# Patient Record
Sex: FEMALE | Race: WHITE | URBAN - METROPOLITAN AREA
[De-identification: names, ages, dates, MRNs, and addresses within clinical notes are randomized per-mention and may not be internally consistent; named-entity substitution may affect disease eponyms.]

---

## 2017-04-11 ENCOUNTER — HOSPITAL ENCOUNTER (OUTPATIENT)
Dept: CT IMAGING | Age: 37
Discharge: HOME | End: 2017-04-11
Attending: INTERNAL MEDICINE | Admitting: INTERNAL MEDICINE

## 2017-04-11 DIAGNOSIS — R93.89 ABNORMAL RADIOLOGICAL FINDINGS IN SKIN AND SUBCUTANEOUS TISSUE: ICD-10-CM

## 2017-04-11 DIAGNOSIS — R06.02 SHORTNESS OF BREATH: ICD-10-CM

## 2017-10-04 ENCOUNTER — HOSPITAL ENCOUNTER (OUTPATIENT)
Dept: OTHER | Age: 37
Discharge: HOME | End: 2017-10-04
Attending: INTERNAL MEDICINE | Admitting: INTERNAL MEDICINE

## 2017-10-04 DIAGNOSIS — M79.672 LEFT FOOT PAIN: ICD-10-CM

## 2018-11-15 ENCOUNTER — HOSPITAL ENCOUNTER (EMERGENCY)
Age: 38
Discharge: HOME | End: 2018-11-15
Attending: EMERGENCY MEDICINE
Payer: MEDICARE

## 2018-11-15 ENCOUNTER — APPOINTMENT (OUTPATIENT)
Dept: GENERAL RADIOLOGY | Age: 38
End: 2018-11-15
Payer: MEDICARE

## 2018-11-15 VITALS
HEART RATE: 59 BPM | RESPIRATION RATE: 17 BRPM | TEMPERATURE: 98 F | OXYGEN SATURATION: 96 % | BODY MASS INDEX: 43.77 KG/M2 | DIASTOLIC BLOOD PRESSURE: 92 MMHG | WEIGHT: 247 LBS | SYSTOLIC BLOOD PRESSURE: 122 MMHG | HEIGHT: 63 IN

## 2018-11-15 DIAGNOSIS — R07.89 ATYPICAL CHEST PAIN: Primary | ICD-10-CM

## 2018-11-15 LAB
ANION GAP SERPL CALCULATED.3IONS-SCNC: 12 MMOL/L (ref 3–16)
BASOPHILS ABSOLUTE: 0.1 K/UL (ref 0–0.2)
BASOPHILS RELATIVE PERCENT: 0.9 %
BUN BLDV-MCNC: 15 MG/DL (ref 7–20)
CALCIUM SERPL-MCNC: 8.8 MG/DL (ref 8.3–10.6)
CHLORIDE BLD-SCNC: 106 MMOL/L (ref 99–110)
CO2: 21 MMOL/L (ref 21–32)
CREAT SERPL-MCNC: 0.6 MG/DL (ref 0.6–1.1)
EKG ATRIAL RATE: 90 BPM
EKG DIAGNOSIS: NORMAL
EKG P AXIS: 33 DEGREES
EKG P-R INTERVAL: 164 MS
EKG Q-T INTERVAL: 360 MS
EKG QRS DURATION: 78 MS
EKG QTC CALCULATION (BAZETT): 440 MS
EKG R AXIS: 13 DEGREES
EKG T AXIS: 58 DEGREES
EKG VENTRICULAR RATE: 90 BPM
EOSINOPHILS ABSOLUTE: 0.2 K/UL (ref 0–0.6)
EOSINOPHILS RELATIVE PERCENT: 1.6 %
GFR AFRICAN AMERICAN: >60
GFR NON-AFRICAN AMERICAN: >60
GLUCOSE BLD-MCNC: 102 MG/DL (ref 70–99)
HCT VFR BLD CALC: 40.5 % (ref 36–48)
HEMOGLOBIN: 13.6 G/DL (ref 12–16)
LYMPHOCYTES ABSOLUTE: 2.8 K/UL (ref 1–5.1)
LYMPHOCYTES RELATIVE PERCENT: 29.4 %
MCH RBC QN AUTO: 27 PG (ref 26–34)
MCHC RBC AUTO-ENTMCNC: 33.7 G/DL (ref 31–36)
MCV RBC AUTO: 80.3 FL (ref 80–100)
MONOCYTES ABSOLUTE: 0.8 K/UL (ref 0–1.3)
MONOCYTES RELATIVE PERCENT: 8.6 %
NEUTROPHILS ABSOLUTE: 5.7 K/UL (ref 1.7–7.7)
NEUTROPHILS RELATIVE PERCENT: 59.5 %
PDW BLD-RTO: 16.4 % (ref 12.4–15.4)
PLATELET # BLD: 222 K/UL (ref 135–450)
PMV BLD AUTO: 9.2 FL (ref 5–10.5)
POTASSIUM SERPL-SCNC: 4.2 MMOL/L (ref 3.5–5.1)
RBC # BLD: 5.04 M/UL (ref 4–5.2)
SODIUM BLD-SCNC: 139 MMOL/L (ref 136–145)
TROPONIN: <0.01 NG/ML
TROPONIN: <0.01 NG/ML
WBC # BLD: 9.5 K/UL (ref 4–11)

## 2018-11-15 PROCEDURE — 83880 ASSAY OF NATRIURETIC PEPTIDE: CPT

## 2018-11-15 PROCEDURE — 71046 X-RAY EXAM CHEST 2 VIEWS: CPT

## 2018-11-15 PROCEDURE — 99285 EMERGENCY DEPT VISIT HI MDM: CPT

## 2018-11-15 PROCEDURE — 84484 ASSAY OF TROPONIN QUANT: CPT

## 2018-11-15 PROCEDURE — 85025 COMPLETE CBC W/AUTO DIFF WBC: CPT

## 2018-11-15 PROCEDURE — 80048 BASIC METABOLIC PNL TOTAL CA: CPT

## 2018-11-15 PROCEDURE — 93005 ELECTROCARDIOGRAM TRACING: CPT | Performed by: EMERGENCY MEDICINE

## 2018-11-15 RX ORDER — AMLODIPINE BESYLATE 5 MG/1
5 TABLET ORAL
COMMUNITY

## 2018-11-15 RX ORDER — ROSUVASTATIN CALCIUM 5 MG/1
5 TABLET, COATED ORAL
COMMUNITY
Start: 2016-10-02

## 2018-11-15 RX ORDER — LIRAGLUTIDE 6 MG/ML
INJECTION SUBCUTANEOUS
COMMUNITY
Start: 2018-09-14

## 2018-11-15 RX ORDER — METHOCARBAMOL 500 MG/1
250 TABLET, FILM COATED ORAL
COMMUNITY
Start: 2018-09-05

## 2018-11-15 ASSESSMENT — ENCOUNTER SYMPTOMS
SORE THROAT: 0
VOMITING: 0
ABDOMINAL PAIN: 0
FACIAL SWELLING: 0
DIARRHEA: 0
CHEST TIGHTNESS: 0
NAUSEA: 0
SINUS PAIN: 0
SINUS PRESSURE: 0
EYE REDNESS: 0
EYE PAIN: 0
SHORTNESS OF BREATH: 0
EYE DISCHARGE: 0
CHOKING: 0
EYE ITCHING: 0
RHINORRHEA: 0
CONSTIPATION: 0
BACK PAIN: 1
ABDOMINAL DISTENTION: 0
WHEEZING: 0
COUGH: 1
STRIDOR: 0
COLOR CHANGE: 0
APNEA: 0

## 2018-11-15 ASSESSMENT — PAIN DESCRIPTION - LOCATION: LOCATION: CHEST

## 2018-11-15 ASSESSMENT — PAIN DESCRIPTION - DESCRIPTORS: DESCRIPTORS: SHARP

## 2018-11-15 ASSESSMENT — PAIN DESCRIPTION - PAIN TYPE: TYPE: ACUTE PAIN

## 2018-11-15 ASSESSMENT — PAIN DESCRIPTION - ORIENTATION: ORIENTATION: LEFT

## 2018-11-15 ASSESSMENT — PAIN SCALES - GENERAL: PAINLEVEL_OUTOF10: 8

## 2018-11-15 NOTE — ED PROVIDER NOTES
emergencydepartment physician Marck Hassan MD  Rhythm: normal sinus   Rate: normal  Axis: normal  Ectopy: none  Conduction: normal  ST Segments: normal  T Waves:normal  Q Waves: none  Clinical Impression: no acute changes      RADIOLOGY:  XR CHEST STANDARD (2 VW)   Final Result      No evidence for acute cardiopulmonary disease. LABS:   Results for orders placed or performed during the hospital encounter of 11/15/18   Troponin   Result Value Ref Range    Troponin <0.01 <0.01 ng/mL   Basic Metabolic Panel   Result Value Ref Range    Sodium 139 136 - 145 mmol/L    Potassium 4.2 3.5 - 5.1 mmol/L    Chloride 106 99 - 110 mmol/L    CO2 21 21 - 32 mmol/L    Anion Gap 12 3 - 16    Glucose 102 (H) 70 - 99 mg/dL    BUN 15 7 - 20 mg/dL    CREATININE 0.6 0.6 - 1.1 mg/dL    GFR Non-African American >60 >60    GFR African American >60 >60    Calcium 8.8 8.3 - 10.6 mg/dL   CBC auto differential   Result Value Ref Range    WBC 9.5 4.0 - 11.0 K/uL    RBC 5.04 4.00 - 5.20 M/uL    Hemoglobin 13.6 12.0 - 16.0 g/dL    Hematocrit 40.5 36.0 - 48.0 %    MCV 80.3 80.0 - 100.0 fL    MCH 27.0 26.0 - 34.0 pg    MCHC 33.7 31.0 - 36.0 g/dL    RDW 16.4 (H) 12.4 - 15.4 %    Platelets 596 551 - 407 K/uL    MPV 9.2 5.0 - 10.5 fL    Neutrophils % 59.5 %    Lymphocytes % 29.4 %    Monocytes % 8.6 %    Eosinophils % 1.6 %    Basophils % 0.9 %    Neutrophils # 5.7 1.7 - 7.7 K/uL    Lymphocytes # 2.8 1.0 - 5.1 K/uL    Monocytes # 0.8 0.0 - 1.3 K/uL    Eosinophils # 0.2 0.0 - 0.6 K/uL    Basophils # 0.1 0.0 - 0.2 K/uL   Troponin   Result Value Ref Range    Troponin <0.01 <0.01 ng/mL   EKG 12 Lead   Result Value Ref Range    Ventricular Rate 90 BPM    Atrial Rate 90 BPM    P-R Interval 164 ms    QRS Duration 78 ms    Q-T Interval 360 ms    QTc Calculation (Bazett) 440 ms    P Axis 33 degrees    R Axis 13 degrees    T Axis 58 degrees    Diagnosis       EKG performed in ER and to be interpreted by ER physician. Confirmed by MD, ER ([id number]),

## 2018-11-15 NOTE — ED TRIAGE NOTES
Patient to ER c/o chest pain that radiates along her left arm and pt states she is chronically short of breath.

## 2018-11-16 LAB
B-TYPE NATRIURETIC PEPTIDE: 22 PG/ML (ref 0–99.9)
PERFORMED ON: NORMAL
POC SAMPLE TYPE: NORMAL

## 2019-08-01 ENCOUNTER — HOSPITAL ENCOUNTER (EMERGENCY)
Age: 39
Discharge: HOME | End: 2019-08-01
Payer: MEDICARE

## 2019-08-01 ENCOUNTER — APPOINTMENT (OUTPATIENT)
Dept: GENERAL RADIOLOGY | Age: 39
End: 2019-08-01
Payer: MEDICARE

## 2019-08-01 VITALS
SYSTOLIC BLOOD PRESSURE: 113 MMHG | HEART RATE: 92 BPM | DIASTOLIC BLOOD PRESSURE: 83 MMHG | RESPIRATION RATE: 12 BRPM | OXYGEN SATURATION: 97 % | TEMPERATURE: 99.2 F

## 2019-08-01 DIAGNOSIS — S60.211A CONTUSION OF RIGHT WRIST, INITIAL ENCOUNTER: Primary | ICD-10-CM

## 2019-08-01 PROCEDURE — 99283 EMERGENCY DEPT VISIT LOW MDM: CPT

## 2019-08-01 PROCEDURE — 73110 X-RAY EXAM OF WRIST: CPT

## 2019-08-01 RX ORDER — KETOROLAC TROMETHAMINE 30 MG/ML
15 INJECTION, SOLUTION INTRAMUSCULAR; INTRAVENOUS ONCE
Status: DISCONTINUED | OUTPATIENT
Start: 2019-08-01 | End: 2019-08-02 | Stop reason: HOSPADM

## 2019-08-01 ASSESSMENT — PAIN DESCRIPTION - LOCATION: LOCATION: WRIST

## 2019-08-01 ASSESSMENT — PAIN SCALES - GENERAL: PAINLEVEL_OUTOF10: 4

## 2019-08-01 ASSESSMENT — PAIN DESCRIPTION - PAIN TYPE: TYPE: ACUTE PAIN

## 2019-08-01 ASSESSMENT — PAIN DESCRIPTION - ORIENTATION: ORIENTATION: RIGHT

## 2019-08-05 ASSESSMENT — ENCOUNTER SYMPTOMS
VOMITING: 0
DIARRHEA: 0
SORE THROAT: 0
ABDOMINAL PAIN: 0
NAUSEA: 0
SHORTNESS OF BREATH: 0
COUGH: 0
WHEEZING: 0

## 2019-08-05 NOTE — ED PROVIDER NOTES
Diabetes mellitus ([facility] [address] Fibromyalgia, GERD (gastroesophageal reflux disease), Hypertension, Morbid obesity ([facility] [address] and Self mutilating behavior. She has a past surgical history that includes Breast surgery; Tubal ligation; Cardiac catheterization (10/01/2015); and Hand surgery. Her family history includes Cancer in her maternal grandfather, maternal grandmother, paternal grandfather, and paternal grandmother; Heart Disease in her brother, brother, father, maternal grandfather, and paternal grandfather. She reports that she has been smoking cigarettes. She has a 27.00 pack-year smoking history. She has quit using smokeless tobacco. She reports that she does not drink alcohol or use drugs. Medications     Discharge Medication List as of 8/1/2019 11:36 PM      CONTINUE these medications which have NOT CHANGED    Details   VICTOZA 18 MG/3ML SOPN SC injection DAWHistorical Med      amLODIPine (NORVASC) 5 MG tablet Take 5 mg by mouthHistorical Med      rosuvastatin (CRESTOR) 5 MG tablet Take 5 mg by mouthHistorical Med      methocarbamol (ROBAXIN) 500 MG tablet Take 250 mg by mouthHistorical Med      ondansetron (ZOFRAN ODT) 4 MG disintegrating tablet Take 1 tablet by mouth every 8 hours as needed for Nausea, Disp-20 tablet, R-0Print      pantoprazole (PROTONIX) 40 MG tablet Take 1 tablet by mouth daily, Disp-30 tablet, R-0Print      celecoxib (CELEBREX) 100 MG capsule Take 100 mg by mouth 2 times daily      aspirin 325 MG tablet Take 325 mg by mouth daily. metFORMIN ER (GLUCOPHAGE-XR) 500 MG XR tablet Take 1 tablet by mouth daily (with breakfast). Start from Thursday, Disp-30 tablet, R-3      metoprolol (LOPRESSOR) 25 MG tablet Take 1 tablet by mouth 2 times daily. , Disp-60 tablet, R-3      HYDROcodone-acetaminophen (NORCO) 5-325 MG per tablet Take 1 tablet by mouth every 6 hours as needed for Pain.       Cholecalciferol (VITAMIN D) 2000 UNITS CAPS capsule Take 1 capsule by mouth daily       pregabalin (LYRICA)

## 2019-09-27 ENCOUNTER — HOSPITAL ENCOUNTER (OUTPATIENT)
Age: 39
Setting detail: SPECIMEN
Discharge: HOME | End: 2019-09-27
Payer: MEDICARE

## 2019-09-27 LAB
ANION GAP SERPL CALCULATED.3IONS-SCNC: 11 MMOL/L (ref 3–16)
BUN BLDV-MCNC: 12 MG/DL (ref 7–20)
CALCIUM SERPL-MCNC: 9.6 MG/DL (ref 8.3–10.6)
CHLORIDE BLD-SCNC: 104 MMOL/L (ref 99–110)
CO2: 23 MMOL/L (ref 21–32)
CREAT SERPL-MCNC: 0.8 MG/DL (ref 0.6–1.1)
GFR AFRICAN AMERICAN: >60
GFR NON-AFRICAN AMERICAN: >60
GLUCOSE BLD-MCNC: 98 MG/DL (ref 70–99)
POTASSIUM SERPL-SCNC: 4.5 MMOL/L (ref 3.5–5.1)
SODIUM BLD-SCNC: 138 MMOL/L (ref 136–145)

## 2019-09-27 PROCEDURE — 36415 COLL VENOUS BLD VENIPUNCTURE: CPT

## 2019-09-27 PROCEDURE — 80048 BASIC METABOLIC PNL TOTAL CA: CPT
